# Patient Record
Sex: MALE | Race: WHITE | NOT HISPANIC OR LATINO
[De-identification: names, ages, dates, MRNs, and addresses within clinical notes are randomized per-mention and may not be internally consistent; named-entity substitution may affect disease eponyms.]

---

## 2019-05-24 ENCOUNTER — APPOINTMENT (OUTPATIENT)
Dept: UROLOGY | Facility: CLINIC | Age: 74
End: 2019-05-24
Payer: MEDICARE

## 2019-05-24 VITALS
SYSTOLIC BLOOD PRESSURE: 134 MMHG | HEART RATE: 80 BPM | DIASTOLIC BLOOD PRESSURE: 86 MMHG | RESPIRATION RATE: 17 BRPM | TEMPERATURE: 98.3 F

## 2019-05-24 DIAGNOSIS — N41.9 INFLAMMATORY DISEASE OF PROSTATE, UNSPECIFIED: ICD-10-CM

## 2019-05-24 PROCEDURE — 99204 OFFICE O/P NEW MOD 45 MIN: CPT | Mod: 25

## 2019-05-24 PROCEDURE — 51798 US URINE CAPACITY MEASURE: CPT

## 2019-05-24 NOTE — REVIEW OF SYSTEMS
[Negative] : Heme/Lymph [Seen by urologist before (Name)  ___] : Preciously seen by a urologist: [unfilled] [Wake up at night to urinate  How many times?  ___] : wakes up to urinate [unfilled] times during the night [Urine retention] : urine retention [Slow urine stream] : slow urine stream

## 2019-05-29 LAB
BACTERIA UR CULT: NORMAL
BILIRUB UR QL STRIP: NORMAL
GLUCOSE UR-MCNC: NORMAL
HCG UR QL: 0.2 EU/DL
HGB UR QL STRIP.AUTO: NORMAL
KETONES UR-MCNC: NORMAL
LEUKOCYTE ESTERASE UR QL STRIP: NORMAL
NITRITE UR QL STRIP: NORMAL
PH UR STRIP: 5
PROT UR STRIP-MCNC: NORMAL
SP GR UR STRIP: 1.01

## 2019-05-29 NOTE — PHYSICAL EXAM
[General Appearance - Well Developed] : well developed [General Appearance - Well Nourished] : well nourished [Edema] : no peripheral edema [Exaggerated Use Of Accessory Muscles For Inspiration] : no accessory muscle use [Abdomen Tenderness] : non-tender [Abdomen Soft] : soft [Abdomen Mass (___ Cm)] : no abdominal mass palpated [Size ___ (gms)] : size was estimated to be [unfilled] g [Abdomen Hernia] : no hernia was discovered [Prostate Hard Area Or Nodule Bilaterally] : had no palpable nodules [Rectal Exam - Prostate] : was not indurated [Nl Inspection] : the anus was normal on inspection. [No Lesions] : no lesions [Normal] : normal [Circumcised] : the penis was circumcised [Scrotum Hydrocele On The Right] : no hydrocele [Scrotum Hydrocele On The Left] : no hydrocele [Testes] : normal [Epididymis] : was normal [Vas Deferens / Spermatic Cord] : was normal [Normal Station and Gait] : the gait and station were normal for the patient's age [] : no rash [No Focal Deficits] : no focal deficits [Inguinal Lymph Nodes Enlarged Bilaterally] : inguinal [Oriented To Time, Place, And Person] : oriented to person, place, and time

## 2019-05-29 NOTE — HISTORY OF PRESENT ILLNESS
[FreeTextEntry1] : Patient presents for management of some urinary issues.\par he notes fairy acute onset of dysuria extending into perineum with increased frequency and a slower stream. felt a bit better when bladder full. He had no hematuria, discharge or other obstructive symptoms,. He has prior h/o LUTs and has been on Flomax for years, though doesn't like the ejaculatory side effects. His PCP treated him with Famvir which he felt helped; when symptoms returned he took Ciprofloxacin for 7 days with no help. notes prior episodes of prostatitis. \par \par  - prostate 40cc; PSA 0.5\par

## 2019-05-29 NOTE — ASSESSMENT
[FreeTextEntry1] : unclear if prostatitis alone and/or LUts - will treat empirically and change alpha blocker

## 2019-07-02 ENCOUNTER — APPOINTMENT (OUTPATIENT)
Dept: UROLOGY | Facility: CLINIC | Age: 74
End: 2019-07-02
Payer: MEDICARE

## 2019-07-02 PROCEDURE — 99213 OFFICE O/P EST LOW 20 MIN: CPT

## 2019-07-02 NOTE — ASSESSMENT
[FreeTextEntry1] : doing well - discussed next steps - increase to 10mg, stay at 7 or drop back to 5mg\par ejaculatory issue betetr.\par Will drop back to 5mg

## 2019-07-02 NOTE — HISTORY OF PRESENT ILLNESS
[FreeTextEntry1] : Patient presents for management of some urinary issues.\par he notes fairy acute onset of dysuria extending into perineum with increased frequency and a slower stream. felt a bit better when bladder full. He had no hematuria, discharge or other obstructive symptoms,. He has prior h/o LUTs and has been on Flomax for years, though doesn't like the ejaculatory side effects. His PCP treated him with Famvir which he felt helped; when symptoms returned he took Ciprofloxacin for 7 days with no help. notes prior episodes of prostatitis. \par \par treated him empirically with Bactrim plus terazosin. Up to 7mg now - voiding much better than before.\par no dizziness. Not sure if 7mg was hugely different from 5mg \par

## 2019-12-09 ENCOUNTER — RX RENEWAL (OUTPATIENT)
Age: 74
End: 2019-12-09

## 2020-01-24 ENCOUNTER — APPOINTMENT (OUTPATIENT)
Dept: UROLOGY | Facility: CLINIC | Age: 75
End: 2020-01-24
Payer: MEDICARE

## 2020-01-24 VITALS
OXYGEN SATURATION: 95 % | HEART RATE: 75 BPM | DIASTOLIC BLOOD PRESSURE: 78 MMHG | SYSTOLIC BLOOD PRESSURE: 123 MMHG | TEMPERATURE: 97.5 F | RESPIRATION RATE: 17 BRPM

## 2020-01-24 PROCEDURE — 99213 OFFICE O/P EST LOW 20 MIN: CPT

## 2020-01-24 RX ORDER — SULFAMETHOXAZOLE AND TRIMETHOPRIM 800; 160 MG/1; MG/1
800-160 TABLET ORAL TWICE DAILY
Qty: 14 | Refills: 0 | Status: ACTIVE | OUTPATIENT
Start: 2020-01-24

## 2020-01-24 NOTE — PHYSICAL EXAM
[General Appearance - Well Developed] : well developed [General Appearance - Well Nourished] : well nourished [Normal Appearance] : normal appearance [Well Groomed] : well groomed [General Appearance - In No Acute Distress] : no acute distress [Abdomen Soft] : soft [Abdomen Tenderness] : non-tender [Costovertebral Angle Tenderness] : no ~M costovertebral angle tenderness [Urethral Meatus] : meatus normal [Urinary Bladder Findings] : the bladder was normal on palpation [Scrotum] : the scrotum was normal [Testes Mass (___cm)] : there were no testicular masses [No Prostate Nodules] : no prostate nodules [Prostate Size ___ gm] : prostate size [unfilled] gm [Edema] : no peripheral edema [] : no respiratory distress [Respiration, Rhythm And Depth] : normal respiratory rhythm and effort [Exaggerated Use Of Accessory Muscles For Inspiration] : no accessory muscle use [Oriented To Time, Place, And Person] : oriented to person, place, and time [Affect] : the affect was normal [Mood] : the mood was normal [Normal Station and Gait] : the gait and station were normal for the patient's age [Not Anxious] : not anxious [No Focal Deficits] : no focal deficits [No Palpable Adenopathy] : no palpable adenopathy [FreeTextEntry1] : p

## 2020-01-24 NOTE — HISTORY OF PRESENT ILLNESS
[FreeTextEntry1] : MR. Castano is a 74 yom who presents for f/u of prostatitis and LUTs 2/2 to BPH.  He reports that he has recurrence of his prostatitis symptoms, with slight burning on urination.  He self-medicated with leftover bactrim that he had, which he said improved his symptoms signficantly.  He is currently taking 7mg of the terazosin, and his other urinary symptoms have been stable since last visit, including frequency and strength of stream.  Patient is currently experiencing no nausea and no anorexia no urinary retention, no gross hematuria, no bladder spasm, no abdominal pain, no flank pain, no fever and no fatigue\par

## 2020-01-24 NOTE — ASSESSMENT
[FreeTextEntry1] : Patient history is consistent with a recurrence of his prostatitis.  Bactrim has worked for him in the past and I will prescribe another dose to be taken over 7 days.  We will follow-up in 6 months.

## 2020-01-27 ENCOUNTER — RX RENEWAL (OUTPATIENT)
Age: 75
End: 2020-01-27

## 2020-01-27 RX ORDER — SULFAMETHOXAZOLE AND TRIMETHOPRIM 800; 160 MG/1; MG/1
800-160 TABLET ORAL TWICE DAILY
Qty: 20 | Refills: 1 | Status: ACTIVE | COMMUNITY
Start: 2019-05-24 | End: 1900-01-01

## 2020-07-13 ENCOUNTER — RX RENEWAL (OUTPATIENT)
Age: 75
End: 2020-07-13

## 2020-07-24 ENCOUNTER — APPOINTMENT (OUTPATIENT)
Dept: UROLOGY | Facility: CLINIC | Age: 75
End: 2020-07-24

## 2020-09-17 ENCOUNTER — APPOINTMENT (OUTPATIENT)
Dept: DISASTER EMERGENCY | Facility: CLINIC | Age: 75
End: 2020-09-17

## 2020-12-16 ENCOUNTER — RX RENEWAL (OUTPATIENT)
Age: 75
End: 2020-12-16

## 2021-01-20 ENCOUNTER — RX RENEWAL (OUTPATIENT)
Age: 76
End: 2021-01-20

## 2021-04-06 ENCOUNTER — OUTPATIENT (OUTPATIENT)
Dept: OUTPATIENT SERVICES | Facility: HOSPITAL | Age: 76
LOS: 1 days | End: 2021-04-06
Payer: COMMERCIAL

## 2021-04-06 VITALS
OXYGEN SATURATION: 97 % | SYSTOLIC BLOOD PRESSURE: 118 MMHG | HEART RATE: 83 BPM | RESPIRATION RATE: 16 BRPM | WEIGHT: 190.04 LBS | DIASTOLIC BLOOD PRESSURE: 74 MMHG | TEMPERATURE: 98 F | HEIGHT: 71 IN

## 2021-04-06 DIAGNOSIS — Z87.81 PERSONAL HISTORY OF (HEALED) TRAUMATIC FRACTURE: ICD-10-CM

## 2021-04-06 DIAGNOSIS — Z11.52 ENCOUNTER FOR SCREENING FOR COVID-19: ICD-10-CM

## 2021-04-06 DIAGNOSIS — Z95.1 PRESENCE OF AORTOCORONARY BYPASS GRAFT: Chronic | ICD-10-CM

## 2021-04-06 DIAGNOSIS — Z98.890 OTHER SPECIFIED POSTPROCEDURAL STATES: Chronic | ICD-10-CM

## 2021-04-06 DIAGNOSIS — I25.10 ATHEROSCLEROTIC HEART DISEASE OF NATIVE CORONARY ARTERY WITHOUT ANGINA PECTORIS: ICD-10-CM

## 2021-04-06 DIAGNOSIS — J34.89 OTHER SPECIFIED DISORDERS OF NOSE AND NASAL SINUSES: ICD-10-CM

## 2021-04-06 DIAGNOSIS — Z01.818 ENCOUNTER FOR OTHER PREPROCEDURAL EXAMINATION: ICD-10-CM

## 2021-04-06 LAB
ANION GAP SERPL CALC-SCNC: 14 MMOL/L — SIGNIFICANT CHANGE UP (ref 5–17)
BUN SERPL-MCNC: 19 MG/DL — SIGNIFICANT CHANGE UP (ref 7–23)
CALCIUM SERPL-MCNC: 9.1 MG/DL — SIGNIFICANT CHANGE UP (ref 8.4–10.5)
CHLORIDE SERPL-SCNC: 104 MMOL/L — SIGNIFICANT CHANGE UP (ref 96–108)
CO2 SERPL-SCNC: 20 MMOL/L — LOW (ref 22–31)
CREAT SERPL-MCNC: 0.97 MG/DL — SIGNIFICANT CHANGE UP (ref 0.5–1.3)
GLUCOSE SERPL-MCNC: 121 MG/DL — HIGH (ref 70–99)
HCT VFR BLD CALC: 44.4 % — SIGNIFICANT CHANGE UP (ref 39–50)
HGB BLD-MCNC: 14.4 G/DL — SIGNIFICANT CHANGE UP (ref 13–17)
MCHC RBC-ENTMCNC: 29.4 PG — SIGNIFICANT CHANGE UP (ref 27–34)
MCHC RBC-ENTMCNC: 32.4 GM/DL — SIGNIFICANT CHANGE UP (ref 32–36)
MCV RBC AUTO: 90.6 FL — SIGNIFICANT CHANGE UP (ref 80–100)
NRBC # BLD: 0 /100 WBCS — SIGNIFICANT CHANGE UP (ref 0–0)
PLATELET # BLD AUTO: 364 K/UL — SIGNIFICANT CHANGE UP (ref 150–400)
POTASSIUM SERPL-MCNC: 3.9 MMOL/L — SIGNIFICANT CHANGE UP (ref 3.5–5.3)
POTASSIUM SERPL-SCNC: 3.9 MMOL/L — SIGNIFICANT CHANGE UP (ref 3.5–5.3)
RBC # BLD: 4.9 M/UL — SIGNIFICANT CHANGE UP (ref 4.2–5.8)
RBC # FLD: 14.7 % — HIGH (ref 10.3–14.5)
SARS-COV-2 RNA SPEC QL NAA+PROBE: SIGNIFICANT CHANGE UP
SODIUM SERPL-SCNC: 138 MMOL/L — SIGNIFICANT CHANGE UP (ref 135–145)
WBC # BLD: 6.08 K/UL — SIGNIFICANT CHANGE UP (ref 3.8–10.5)
WBC # FLD AUTO: 6.08 K/UL — SIGNIFICANT CHANGE UP (ref 3.8–10.5)

## 2021-04-06 PROCEDURE — U0005: CPT

## 2021-04-06 PROCEDURE — 85027 COMPLETE CBC AUTOMATED: CPT

## 2021-04-06 PROCEDURE — C9803: CPT

## 2021-04-06 PROCEDURE — G0463: CPT

## 2021-04-06 PROCEDURE — U0003: CPT

## 2021-04-06 PROCEDURE — 80048 BASIC METABOLIC PNL TOTAL CA: CPT

## 2021-04-06 RX ORDER — SODIUM CHLORIDE 9 MG/ML
3 INJECTION INTRAMUSCULAR; INTRAVENOUS; SUBCUTANEOUS EVERY 8 HOURS
Refills: 0 | Status: DISCONTINUED | OUTPATIENT
Start: 2021-04-08 | End: 2021-04-22

## 2021-04-06 RX ORDER — LIDOCAINE HCL 20 MG/ML
0.2 VIAL (ML) INJECTION ONCE
Refills: 0 | Status: DISCONTINUED | OUTPATIENT
Start: 2021-04-08 | End: 2021-04-22

## 2021-04-06 NOTE — H&P PST ADULT - NSICDXPASTMEDICALHX_GEN_ALL_CORE_FT
PAST MEDICAL HISTORY:  CAD (coronary atherosclerotic disease) 08/2008, stent palced x1     PAST MEDICAL HISTORY:  Acid reflux s/p Endoscopy 2 weks ago, was normal    BPH (benign prostatic hyperplasia)     CAD (coronary atherosclerotic disease) 08/2008 & had  coronary stent palced x1    History of fracture of nasal bone     Hyperlipemia

## 2021-04-06 NOTE — H&P PST ADULT - HISTORY OF PRESENT ILLNESS
75 year old male with h/o fall & repair of nasal injury/ nasal bone fracture repair in 05/2020, reports having surgery to repair nose to correct the defect, presents for scheduled surgery on 04/08/2021. 75 year old male with h/o fall & repair of nasal bone fracture repair of nostrils in 05/2020, reports having surgery to correct the defect, presents for scheduled Alar Battan graft to left nostril with conchal cartilage harvest surgery on 04/08/2021.  *** JACK-Covid 2 swab testing done on 04/06/2021,.  Patient  denies any signs & symptoms of covid-19, no recent travel outside  [facility] with in 14 days , not visited any sick person.   Denies fever, cough, shortness of breadth, diarrhea, throat pain, changes in taste/smell or any rash.

## 2021-04-06 NOTE — H&P PST ADULT - NSICDXPROBLEM_GEN_ALL_CORE_FT
TAKE 150 MG (TWO 75MG TABLETS) OF PLAVIX TONIGHT AND AGAIN TOMORROW MORNING  Cindy Borja you will take one 75mg tablet daily, beginning on Friday 2/2/18 
PROBLEM DIAGNOSES  Problem: H/O closed fracture of nasal bones  Assessment and Plan: scheduled Alar Battan graft to left nostril with conchal cartilage harvest surgery on 04/08/2021.  *** JACK-Covid 2 swab testing done on 04/06/2021,.      Problem: CAD (coronary artery disease)  Assessment and Plan: Instructed to continue meds &  take with sips of water in AM the day of surgery

## 2021-04-06 NOTE — H&P PST ADULT - ATTENDING COMMENTS
Patient presents for reconstruction of his left alar rim for collapse. The plan is to perform a left alar battan graft with harvest of conchal cartilage from the left ear. There has been no change in the patient's medical condition since the last visit. He has no complaints of pain, fevers or chills. The risks, benefits and alternatives were discussed with the patient which included infection, bleeding, pain, scarring, asymmetry, nerve injury, asymmetry, breathing issues, cartialge warping, need for revision surgeries, cosmetic deformity, wound healing complications. All of the patient's questions were answered and consent was obtained.

## 2021-04-06 NOTE — H&P PST ADULT - NSICDXPASTSURGICALHX_GEN_ALL_CORE_FT
PAST SURGICAL HISTORY:  H/O elbow surgery right 2018, bursa sac repair    H/O rhinoplasty 09/22/2020    S/P CABG x 4 in 2000, [facility],

## 2021-04-06 NOTE — H&P PST ADULT - ASSESSMENT
scheduled Alar Battan graft to left nostril with conchal cartilage harvest surgery on 04/08/2021.  *** JACK-Covid 2 swab testing done on 04/06/2021,.

## 2021-04-06 NOTE — H&P PST ADULT - HEALTH CARE MAINTENANCE
Flu vaccine in 2020, had moderna second  On yearly Annual physical  Last colonoscopy - 201 Flu vaccine in 2020,  had second dose of moderna Covid vaccine on 3/14/21  On yearly Annual physicals  Last colonoscopy - 201

## 2021-04-07 ENCOUNTER — TRANSCRIPTION ENCOUNTER (OUTPATIENT)
Age: 76
End: 2021-04-07

## 2021-04-07 NOTE — ASU DISCHARGE PLAN (ADULT/PEDIATRIC) - CARE PROVIDER_API CALL
Pee Butler (MD)  Surgery  [address] [place] [id number]  Phone: [phone]  Fax: [phone]  Scheduled Appointment: 04/16/2021

## 2021-04-07 NOTE — ASU DISCHARGE PLAN (ADULT/PEDIATRIC) - NURSING INSTRUCTIONS
******************************************************************************************  Next dose of TYLENOL may be taken at or after _______5.18_____ PM if needed. DO NOT take any additional products containing TYLENOL or ACETAMINOPHEN, such as VICODIN, PERCOCET, NORCO, EXCEDRIN, and any over-the-counter cold medications until this time. DO NOT CONSUME MORE THAN 0860-0298 MG OF TYLENOL (acetaminophen) in a 24-hour period.

## 2021-04-07 NOTE — ASU DISCHARGE PLAN (ADULT/PEDIATRIC) - ASU DC SPECIAL INSTRUCTIONSFT
Keep dressing on the left ear clean and dry for 1 week, do not get wet  No nose blowing, sleep with HOB elevated please  Apply ointment intra-nasally (bacitracin)  No sports, exercise or heavy lifting for 1 week Keep dressing on the left ear clean and dry for 1 week, do not get wet  No nose blowing, sleep with HOB elevated please  Apply ointment intra-nasally (bacitracin)  No sports, exercise or heavy lifting for 1 week  Take tramadol and tylenol for pain as needed

## 2021-04-07 NOTE — BRIEF OPERATIVE NOTE - NSICDXBRIEFPROCEDURE_GEN_ALL_CORE_FT
PROCEDURES:  Reconstruction, nasal valve, with  graft or alar batten graft insertion 07-Apr-2021 14:38:46  Pee Butler

## 2021-04-08 ENCOUNTER — OUTPATIENT (OUTPATIENT)
Dept: OUTPATIENT SERVICES | Facility: HOSPITAL | Age: 76
LOS: 1 days | End: 2021-04-08
Payer: COMMERCIAL

## 2021-04-08 VITALS
TEMPERATURE: 98 F | SYSTOLIC BLOOD PRESSURE: 101 MMHG | OXYGEN SATURATION: 100 % | HEART RATE: 70 BPM | RESPIRATION RATE: 18 BRPM | DIASTOLIC BLOOD PRESSURE: 55 MMHG

## 2021-04-08 VITALS
SYSTOLIC BLOOD PRESSURE: 130 MMHG | WEIGHT: 190.04 LBS | TEMPERATURE: 97 F | HEART RATE: 72 BPM | OXYGEN SATURATION: 97 % | DIASTOLIC BLOOD PRESSURE: 77 MMHG | RESPIRATION RATE: 16 BRPM | HEIGHT: 71 IN

## 2021-04-08 DIAGNOSIS — Z95.1 PRESENCE OF AORTOCORONARY BYPASS GRAFT: Chronic | ICD-10-CM

## 2021-04-08 DIAGNOSIS — J34.89 OTHER SPECIFIED DISORDERS OF NOSE AND NASAL SINUSES: ICD-10-CM

## 2021-04-08 DIAGNOSIS — Z98.890 OTHER SPECIFIED POSTPROCEDURAL STATES: Chronic | ICD-10-CM

## 2021-04-08 PROCEDURE — 30465 REPAIR NASAL STENOSIS: CPT

## 2021-04-08 PROCEDURE — 21235 EAR CARTILAGE GRAFT: CPT | Mod: LT

## 2021-04-08 RX ORDER — EZETIMIBE AND SIMVASTATIN 10; 80 MG/1; MG/1
1 TABLET, FILM COATED ORAL
Qty: 0 | Refills: 0 | DISCHARGE

## 2021-04-08 RX ORDER — OMEPRAZOLE 10 MG/1
1 CAPSULE, DELAYED RELEASE ORAL
Qty: 0 | Refills: 0 | DISCHARGE

## 2021-04-08 RX ORDER — UBIDECARENONE 100 MG
1 CAPSULE ORAL
Qty: 0 | Refills: 0 | DISCHARGE

## 2021-04-08 RX ORDER — OXYCODONE HYDROCHLORIDE 5 MG/1
5 TABLET ORAL ONCE
Refills: 0 | Status: DISCONTINUED | OUTPATIENT
Start: 2021-04-08 | End: 2021-04-08

## 2021-04-08 RX ORDER — CHOLECALCIFEROL (VITAMIN D3) 125 MCG
1 CAPSULE ORAL
Qty: 0 | Refills: 0 | DISCHARGE

## 2021-04-08 RX ORDER — ONDANSETRON 8 MG/1
4 TABLET, FILM COATED ORAL ONCE
Refills: 0 | Status: DISCONTINUED | OUTPATIENT
Start: 2021-04-08 | End: 2021-04-22

## 2021-04-08 RX ORDER — ASPIRIN/CALCIUM CARB/MAGNESIUM 324 MG
1 TABLET ORAL
Qty: 0 | Refills: 0 | DISCHARGE

## 2021-04-08 RX ORDER — SODIUM CHLORIDE 9 MG/ML
1000 INJECTION, SOLUTION INTRAVENOUS
Refills: 0 | Status: DISCONTINUED | OUTPATIENT
Start: 2021-04-08 | End: 2021-04-22

## 2021-04-08 NOTE — ASU PREOP CHECKLIST - TEMPERATURE IN FAHRENHEIT (DEGREES F)
[FreeTextEntry3] : Skin examination performed of the face, neck, chest, hands, lower legs;\par The patient is well, alert and oriented, pleasant and cooperative.\par Eyelids, conjunctivae, oral mucosa, digits and nails all normal.  \par No cervical adenopathy.\par \par penile shaft;  multiple tiny 1mm umbilicated papules;  extensive on R side, some on left; \par  96.8

## 2021-04-08 NOTE — ASU PATIENT PROFILE, ADULT - PSH
H/O elbow surgery  right 2018, bursa sac repair  H/O rhinoplasty  09/22/2020  S/P CABG x 4  in 2000, [facility],

## 2021-04-08 NOTE — ASU PATIENT PROFILE, ADULT - PMH
Acid reflux  s/p Endoscopy 2 weks ago, was normal  BPH (benign prostatic hyperplasia)    CAD (coronary atherosclerotic disease)  08/2008 & had  coronary stent palced x1  History of fracture of nasal bone    Hyperlipemia

## 2021-04-08 NOTE — ASU PREOP CHECKLIST - HEIGHT IN FEET
Pt states he took a pill marked M30 from a friend around 11:30am and within 15 mins began to feel floaty, itchy, and weird. Pt states the pill was supposed to help with pain. Also, smoked weed today. ABCs intact, alert and oriented X3.    5

## 2021-08-11 ENCOUNTER — RX RENEWAL (OUTPATIENT)
Age: 76
End: 2021-08-11

## 2021-08-16 PROBLEM — Z87.81 PERSONAL HISTORY OF (HEALED) TRAUMATIC FRACTURE: Chronic | Status: ACTIVE | Noted: 2021-04-06

## 2021-08-16 PROBLEM — I25.10 ATHEROSCLEROTIC HEART DISEASE OF NATIVE CORONARY ARTERY WITHOUT ANGINA PECTORIS: Chronic | Status: ACTIVE | Noted: 2021-04-06

## 2021-08-16 PROBLEM — E78.5 HYPERLIPIDEMIA, UNSPECIFIED: Chronic | Status: ACTIVE | Noted: 2021-04-06

## 2021-08-16 PROBLEM — N40.0 BENIGN PROSTATIC HYPERPLASIA WITHOUT LOWER URINARY TRACT SYMPTOMS: Chronic | Status: ACTIVE | Noted: 2021-04-06

## 2021-08-16 PROBLEM — K21.9 GASTRO-ESOPHAGEAL REFLUX DISEASE WITHOUT ESOPHAGITIS: Chronic | Status: ACTIVE | Noted: 2021-04-06

## 2021-09-07 ENCOUNTER — RX RENEWAL (OUTPATIENT)
Age: 76
End: 2021-09-07

## 2021-09-08 ENCOUNTER — APPOINTMENT (OUTPATIENT)
Dept: UROLOGY | Facility: CLINIC | Age: 76
End: 2021-09-08
Payer: MEDICARE

## 2021-09-08 DIAGNOSIS — N13.8 BENIGN PROSTATIC HYPERPLASIA WITH LOWER URINARY TRACT SYMPMS: ICD-10-CM

## 2021-09-08 DIAGNOSIS — N40.1 BENIGN PROSTATIC HYPERPLASIA WITH LOWER URINARY TRACT SYMPMS: ICD-10-CM

## 2021-09-08 PROCEDURE — 99213 OFFICE O/P EST LOW 20 MIN: CPT

## 2021-09-08 NOTE — HISTORY OF PRESENT ILLNESS
[FreeTextEntry1] : Patient presents for management of some urinary issues.\par he notes fairy acute onset of dysuria extending into perineum with increased frequency and a slower stream. felt a bit better when bladder full. He had no hematuria, discharge or other obstructive symptoms,. He has prior h/o LUTs and has been on Flomax for years, though doesn't like the ejaculatory side effects. His PCP treated him with Famvir which he felt helped; when symptoms returned he took Ciprofloxacin for 7 days with no help. notes prior episodes of prostatitis. \par \par treated him empirically with Bactrim plus terazosin. Up to 7mg now - voiding much better than before.\par no dizziness. Not sure if 7mg was hugely different from 5mg \par \par 9/21 - on Terazosin 7mg - happy with results. Nocturia 1-2 times with better FOS ets. No dysuria hematuria or dizziness. No outbreaks of Prostatitis. \par

## 2021-09-20 ENCOUNTER — TRANSCRIPTION ENCOUNTER (OUTPATIENT)
Age: 76
End: 2021-09-20

## 2022-04-27 ENCOUNTER — APPOINTMENT (OUTPATIENT)
Dept: ORTHOPEDIC SURGERY | Facility: CLINIC | Age: 77
End: 2022-04-27
Payer: MEDICARE

## 2022-04-27 VITALS — BODY MASS INDEX: 26.6 KG/M2 | WEIGHT: 190 LBS | HEIGHT: 71 IN

## 2022-04-27 PROCEDURE — 99214 OFFICE O/P EST MOD 30 MIN: CPT | Mod: 25

## 2022-04-27 PROCEDURE — 20605 DRAIN/INJ JOINT/BURSA W/O US: CPT | Mod: LT

## 2022-04-27 PROCEDURE — 73610 X-RAY EXAM OF ANKLE: CPT | Mod: LT

## 2022-04-27 PROCEDURE — J3490M: CUSTOM

## 2022-04-27 NOTE — ASSESSMENT
[FreeTextEntry1] : Discussed treatment options with patient.\par WBAT in supportive footwear.\par Ice to affected area.\par Pt. responded well to steroid injection in the past and this was offered again. \par Pt. opts for injection.

## 2022-04-27 NOTE — PROCEDURE
[FreeTextEntry3] : Patient has tried OTC's including aspirin, Ibuprofen, Aleve, etc or prescription NSAIDS, and/or exercises at home and/or physical therapy without satisfactory response and the risks benefits, and alternatives have been discussed, and verbal consent was obtained. \par \par The risks, benefits and contents of the injection have been discussed.  Risks include but are not limited to allergic reaction, flare reaction, permanent white skin discoloration at the injection site and infection.  The patient understands the risks and agrees to having the injection.  All questions have been answered.\par \par An injection of the left ankle joint was performed. The indication for this procedure was pain and inflammation. The site was prepped with alcohol and sterile technique used. An injection of Lidocaine 1cc of 1% , Bupivacaine (Marcaine) 1cc of 0.5% , Methylprednisolone (Depomedrol) 1cc of 80 mg was used. Patient tolerated procedure well. Patient was advised to call if redness, pain or fever occur, apply ice for 15 minutes out of every hour for the next 12-24 hours as tolerated and patient was advised to rest the joint(s) for 3 days. \par \par \par

## 2022-04-27 NOTE — PHYSICAL EXAM
[NL (40)] : plantar flexion 40 degrees [NL 30)] : inversion 30 degrees [NL (20)] : eversion 20 degrees [5___] : [facility] 5[unfilled]/5 [2+] : posterior tibialis pulse: 2+ [Normal] : saphenous nerve sensation normal [Left] : left ankle [Weight -] : weightbearing [] : non-antalgic

## 2022-04-27 NOTE — HISTORY OF PRESENT ILLNESS
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 CSI last visit 4/14/21 with significant improvement until recently. He is very active and plays ball four times a week. WB in supportive footwear. Ice to affected area.  [] : Post Surgical Visit: no [FreeTextEntry1] : L ankle

## 2022-05-27 NOTE — BRIEF OPERATIVE NOTE - PRIMARY SURGEON
5/27/2022       RE: Emiliana Helms  [address]  [facility] [address]     Dear Colleague,    Thank you for referring your patient, Emiliana Helms, to the [facility] [place] at [facility]. Please see a copy of my visit note below.    [facility] Dermatology Note  Encounter Date: May 27, 2022   [employer]-and[employer] and Telephone ([phone]). Location of teledermatologist: [facility] [place].  Start time: 1:30 PM. End time: 1:40 PM.    Dermatology Problem List:  1. Acne vulgaris, inflammatory  - Current tx:  isotretinoin (40-80-40 due to dry eyes/dry skin)  - Previous tx: Accutane (took for 1 month x2, stopped due to insurance issues), doxycycline, Tami, Clindamycin 1% lotion, tretinoin 0.025% cream  - Contraception: abstinence   ____________________________________________    Assessment & Plan:    # Acne vulgaris, chronic, active, improving, doing well on isotretinoin.  Tried to go up to 40 mg BID dosing but too much dryness so went back to 40 mg daily and doing better. We will start at 40 mg daily dosing.  - Labs reviewed: CBC, CMP, lipids, HCG quantitative from 4/27/2022  - Labs ordered: HCG qualitative, CBC and smear (given lymphopenia below).  - Continue to 40 mg once daily for month #3. One month supply with no refills provided.   - Goal dose is 10,500 to 15,400 mg for 150-220  mg/kg dosing in this 70 kg patient.   - Cumulative dose: 2400 mg.    # Lymphopenia.  Intermittent and predating isotretinoin, she notes her father and sibling also have issues and has been told she has low blood counts before. ?benign ethnic neutropenia. Advised CBC and smear with potential for heme referral pending results.  - CBC and smear  - consider heme referral    Procedures Performed:   None    Follow-up: 1 month(s) in-person, or earlier for new or changing lesions    Staff and Scribe:     Scribe  Disclosure:  I, Michelle West, am serving as a scribe to document services personally performed by Debby Cutler MD based on data collection and the provider's statements to me.     Provider Disclosure:   The documentation recorded by the scribe accurately reflects the services I personally performed and the decisions made by me.    Debby Cutler MD    Department of Dermatology  [facility]: Phone: [phone], Fax: [phone]  5/31/2022     ____________________________________________    CC: Accutane (Emiliana is here for an accutane follow-up. States her skin is dryer compared to her last visit.)    HPI:  Ms. Emiliana Helms is a(n) 20 year old female who presents today as a return patient for accutane follow up. The patient was last seen in dermatology on 4/27/2022 by myself, at which time she was advised to increase to 40 mg isotretinoin daily for treatment of acne.     Today, the patient states that she tried to increase to 40 mg twice daily, but her skin got very dry so she went back to once daily and is doing well on this dose. She notes that when she takes 40 mg bid, her eyes get so dry that it is painful for her to wear contact lenses. Patient otherwise reports that her acne is continuing to improve on this medication. She denies headaches, vision changes, GI upset, diarrhea, mood changes, and suicidal ideation. Patient is otherwise feeling well, without additional skin concerns.    Labs Reviewed:  N/A    Physical Exam:  Vitals: There were no vitals taken for this visit.  SKIN: no photos sent.  - No other lesions of concern on areas examined.     Medications:  Current Outpatient Medications   Medication     buPROPion (WELLBUTRIN XL) 150 MG 24 hr tablet     ISOtretinoin (ACCUTANE) 40 MG capsule     busPIRone (BUSPAR) 5 MG tablet     cetirizine (ZYRTEC) 10 MG tablet     clindamycin (CLEOCIN T) 1 % external  lotion     tretinoin (RETIN-A) 0.025 % external cream     No current facility-administered medications for this visit.      Past Medical History:   Patient Active Problem List   Diagnosis     Other idiopathic scoliosis, thoracolumbar region     Intermittent asthma, well controlled     Vitamin D deficiency     Major depression, single episode     Absence attack (H)     Exercise intolerance     Social anxiety disorder     Seasonal allergies     Past Medical History:   Diagnosis Date     Asthma, intermittent      Constipation 06/26/2011     Contact with or exposure to tuberculosis 12/2003    on INH ppd neg     Mild persistent asthma 08/20/2012     Other idiopathic scoliosis, thoracolumbar region 2014     Undiagnosed cardiac murmurs     tiny PFO     Vitamin D deficiency 2017    vitamin D 15        CC No referring provider defined for this encounter. on close of this encounter.       Pee Butler

## 2022-09-24 ENCOUNTER — RX RENEWAL (OUTPATIENT)
Age: 77
End: 2022-09-24

## 2022-10-06 ENCOUNTER — RX RENEWAL (OUTPATIENT)
Age: 77
End: 2022-10-06

## 2023-01-27 ENCOUNTER — APPOINTMENT (OUTPATIENT)
Dept: ORTHOPEDIC SURGERY | Facility: CLINIC | Age: 78
End: 2023-01-27
Payer: MEDICARE

## 2023-01-27 DIAGNOSIS — M19.072 PRIMARY OSTEOARTHRITIS, LEFT ANKLE AND FOOT: ICD-10-CM

## 2023-01-27 PROCEDURE — 99213 OFFICE O/P EST LOW 20 MIN: CPT | Mod: 25

## 2023-01-27 PROCEDURE — J3490M: CUSTOM

## 2023-01-27 PROCEDURE — 20605 DRAIN/INJ JOINT/BURSA W/O US: CPT | Mod: LT

## 2023-01-27 NOTE — PHYSICAL EXAM
[5___] : [facility] 5[unfilled]/5 [2+] : posterior tibialis pulse: 2+ [Normal] : saphenous nerve sensation normal [Left] : left ankle [Weight -] : weightbearing [NL (40)] : plantar flexion 40 degrees [NL 30)] : inversion 30 degrees [] : no pain when stressing lateral tarsal metatarsal joint [de-identified] : eversion 15 degrees [[id number]] : dorsiflexion 15 degrees

## 2023-01-27 NOTE — HISTORY OF PRESENT ILLNESS
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 CSI last visit 4/27/22 with significant improvement.  He reports that the pain has returned.  No new trauma. [] : Post Surgical Visit: no [FreeTextEntry1] : L ankle

## 2023-01-27 NOTE — ASSESSMENT
[FreeTextEntry1] : We discussed another steroid injection and he would like to proceed.\par Icing is recommended.\par Supportive shoes recommended.

## 2023-04-26 ENCOUNTER — RX RENEWAL (OUTPATIENT)
Age: 78
End: 2023-04-26

## 2023-04-26 RX ORDER — TERAZOSIN 2 MG/1
2 CAPSULE ORAL
Qty: 90 | Refills: 1 | Status: ACTIVE | COMMUNITY
Start: 2019-05-24 | End: 1900-01-01

## 2023-08-30 ENCOUNTER — APPOINTMENT (OUTPATIENT)
Dept: ORTHOPEDIC SURGERY | Facility: CLINIC | Age: 78
End: 2023-08-30
Payer: MEDICARE

## 2023-08-30 VITALS — BODY MASS INDEX: 26.6 KG/M2 | WEIGHT: 190 LBS | HEIGHT: 71 IN

## 2023-08-30 PROCEDURE — 73130 X-RAY EXAM OF HAND: CPT | Mod: LT

## 2023-08-30 PROCEDURE — L3908: CPT | Mod: LT

## 2023-08-30 PROCEDURE — 99204 OFFICE O/P NEW MOD 45 MIN: CPT | Mod: 25

## 2023-08-30 PROCEDURE — 29280 STRAPPING OF HAND OR FINGER: CPT | Mod: LT

## 2023-08-31 NOTE — DISCUSSION/SUMMARY
[de-identified] : - reviewed the nature of this injury and prognosis with the patient in layman's terms - discussed indications for both operative and nonoperative treatment - reviewed risks, benefits and alternatives to these - reviewed perioperative expectations in depth including timing to return to sport - with all of this in mind, the patient would like to proceed with ORIF - left cock up wrist brace and buddy straps for now - NSAIDs as needed for pain - f/u 2 weeks postoperatively

## 2023-08-31 NOTE — IMAGING
[Left] : left hand [de-identified] : Left hand with diffuse swelling and ttp over the 3rd MC shaft mild dorsal and volar ecchymosis is noted. Cascade is normal. All digits are nvi with FAROM. Left wrist with full and pain free ROM. There is no ttp over this region.  and intrinsic strength is not assessed due to pain.  [FreeTextEntry1] : left 3rd MC oblique displaced proximal shaft fracture

## 2023-08-31 NOTE — HISTORY OF PRESENT ILLNESS
[de-identified] : Location: Left Hand Injury: Patient reports that he was playing softball when he collided with another player, striking his left hand.  He noted immediate pain and swelling in the hand and was later seen in urgent care where he was placed in a splint with instructions to follow-up as an outpatient.  Today the patient notes mild pain but denies numbness and tingling.  Of note, the patient is extremely active and continues to play softball several times a week. Date of Injury: 08/27/2023 Prior treatments: Splint Hand dominance: Right Occupation: Retired  PMH: CAD, bypass (20 years ago) - pt denies blood thinners, Gerd, htn, BPH, Hyperlipidemia

## 2023-09-12 ENCOUNTER — APPOINTMENT (OUTPATIENT)
Age: 78
End: 2023-09-12
Payer: MEDICARE

## 2023-09-12 PROCEDURE — 26615 TREAT METACARPAL FRACTURE: CPT | Mod: F2

## 2023-09-21 ENCOUNTER — APPOINTMENT (OUTPATIENT)
Dept: ORTHOPEDIC SURGERY | Facility: CLINIC | Age: 78
End: 2023-09-21
Payer: MEDICARE

## 2023-09-21 PROCEDURE — 73130 X-RAY EXAM OF HAND: CPT | Mod: LT

## 2023-09-21 PROCEDURE — 99024 POSTOP FOLLOW-UP VISIT: CPT

## 2023-09-27 ENCOUNTER — RX RENEWAL (OUTPATIENT)
Age: 78
End: 2023-09-27

## 2023-09-27 RX ORDER — TERAZOSIN 5 MG/1
5 CAPSULE ORAL
Qty: 90 | Refills: 3 | Status: ACTIVE | COMMUNITY
Start: 2019-05-24 | End: 1900-01-01

## 2023-10-05 ENCOUNTER — APPOINTMENT (OUTPATIENT)
Dept: ORTHOPEDIC SURGERY | Facility: CLINIC | Age: 78
End: 2023-10-05
Payer: MEDICARE

## 2023-10-05 VITALS — WEIGHT: 190 LBS | HEIGHT: 71 IN | BODY MASS INDEX: 26.6 KG/M2

## 2023-10-05 PROCEDURE — 73130 X-RAY EXAM OF HAND: CPT | Mod: LT

## 2023-10-05 PROCEDURE — 99024 POSTOP FOLLOW-UP VISIT: CPT

## 2023-11-02 ENCOUNTER — APPOINTMENT (OUTPATIENT)
Dept: ORTHOPEDIC SURGERY | Facility: CLINIC | Age: 78
End: 2023-11-02
Payer: MEDICARE

## 2023-11-02 VITALS — HEIGHT: 71 IN | BODY MASS INDEX: 26.6 KG/M2 | WEIGHT: 190 LBS

## 2023-11-02 DIAGNOSIS — S62.323A DISPLACED FRACTURE OF SHAFT OF THIRD METACARPAL BONE, LEFT HAND, INITIAL ENCOUNTER FOR CLOSED FRACTURE: ICD-10-CM

## 2023-11-02 PROCEDURE — 99024 POSTOP FOLLOW-UP VISIT: CPT

## 2023-11-02 PROCEDURE — 73130 X-RAY EXAM OF HAND: CPT | Mod: LT

## 2024-05-20 ENCOUNTER — APPOINTMENT (OUTPATIENT)
Dept: ORTHOPEDIC SURGERY | Facility: CLINIC | Age: 79
End: 2024-05-20
Payer: MEDICARE

## 2024-05-20 DIAGNOSIS — M16.11 UNILATERAL PRIMARY OSTEOARTHRITIS, RIGHT HIP: ICD-10-CM

## 2024-05-20 PROCEDURE — 99204 OFFICE O/P NEW MOD 45 MIN: CPT | Mod: 25

## 2024-05-20 PROCEDURE — J3490M: CUSTOM

## 2024-05-20 PROCEDURE — 20611 DRAIN/INJ JOINT/BURSA W/US: CPT | Mod: RT

## 2024-05-20 PROCEDURE — 72100 X-RAY EXAM L-S SPINE 2/3 VWS: CPT

## 2024-05-20 PROCEDURE — 73503 X-RAY EXAM HIP UNI 4/> VIEWS: CPT | Mod: RT

## 2024-05-23 NOTE — PROCEDURE
[de-identified] : Ultrasound guidance was required to improve accuracy and the diagnostic and/or therapeutic value of the hip injection as well as to avoid critical neurovascular structures located in close proximity to the hip joint [FreeTextEntry1] : R hip US CSI [FreeTextEntry2] : pain [FreeTextEntry3] :  The patient was positioned supine on the exam table with the right anterior hip exposed. All risks and benefits were discussed with the patient including infection, bleeding, allergic reactions, worsening of symptoms, and possible neurovascular damage.  They stated understanding and were agreeable to proceed.   The skin overlying the injection site was sterilely prepped and ultrasound was used to localize the hip joint.  A 22 gauge spinal needle was inserted into the hip joint which was confirmed with ultrasound.  40mg Kenalog and 4cc of 0.5% Marcaine was then injected into the hip joint with confirmed ballottement of the hip capsule.  The needle was withdrawn and a band aid was placed.  The patient tolerated the procedure well without any apparent complications.  They were instructed to perform activities that would normally aggravate their hip and to take note of how much relief they get from the injection.  Counseled on concerning signs and symptoms including redness/swelling and to call the office with any problems.

## 2024-05-23 NOTE — IMAGING
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] : Femoral CAM lesion with Alpha angle greater than 50 [de-identified] : General: NAD, A&Ox3 Gait: Mildly antalgic, no Trendelenburg Foot Progression: neutral Knee Progression: neutral   R Hip Exam: Pain with Log Roll - negative Flexion: 100 Pain with Hyperflexion - no FADIR - pos [place] - pos Extension: 0 Flexion Contracture - none Prone Apprehension Test - neg Ischial tenderness - none Trochanteric tenderness - none   Abduction - 5 /5, pain - yes Adduction - 5 /5 Supine resisted SLR - 5 /5, pain - yes Dorsiflexion 5/5 Plantarflexion 5/5 EHL 5/5 DP/PT 2+, foot is warm and well perfused      Leg Lengths: symmetric

## 2024-05-23 NOTE — HISTORY OF PRESENT ILLNESS
[Right Leg] : right leg [Gradual] : gradual [5] : 5 [4] : 4 [Stabbing] : stabbing [Intermittent] : intermittent [Rest] : rest [Exercising] : exercising [de-identified] : JENNIFER LOWRY is a 78 year male here with right hip pain. Pain has been present for 3-4 months and is located in the groin radiating down his anterior leg to his knee.   Injury - none.  Mechanical symptoms - rarely buckling.  Exacerbating factors/activities/positions -  Pain during or after activity - Pain exacerbated with exercise Back pain - none. Radicular pain - radiating pain down to his right knee.   Previous Treatment: NSAIDs: none, takes Tylenol PT: none.  Surgery: none for this complaint.    Injections: CSI to left ankle, 1 month ago   Occupation: Retired, previously traded on [employer] Sports/Recreational Activities: Softball. [] : Post Surgical Visit: no [FreeTextEntry1] : right hip right knee  [FreeTextEntry5] : Pt has had a gradual onset of right hip pain for the last few weeks, pt has anterior hip pain with an without activity, pt also had pain that shoots down to his knee with activity as well as anterior knee pain and swelling when he plays baseball, pt has a hx of some back pain that is treated with a chiro  [de-identified] : none

## 2024-05-23 NOTE — DISCUSSION/SUMMARY
[de-identified] : - We discussed their diagnosis and treatment options at length including the risks and benefits of both surgical treatment with a total hip replacement and non-surgical options. - We will continue conservative treatment with activity modification, PT, icing, weight loss, and anti-inflammatory medications. - The patient was provided with a PT prescription to work on ROM, hip ER/abductors strengthening, quad/hamstring stretches and strengthening, and other exercises - The patient was advised to let pain guide the gradual advancement of activities. - We also discussed the possible of a corticosteroid injection in order to help decrease inflammation and pain so that they can perform better therapy.  This was performed today. - We discussed that a total joint replacement cannot be done within 3 months of a steroid injection to minimize the risk of infection - F/u in 6 weeks for recheck     Prior to appointment and during encounter with patient extensive medical records were reviewed including but not limited to, hospital records, out patient records, imaging results, and lab data. During this appointment the patient was examined, diagnoses were discussed and explained in a face to face manner. In addition extensive time was spent reviewing aforementioned diagnostic studies. Counseling including abnormal image results, differential diagnoses, treatment options, risk and benefits, lifestyle changes, current condition, and current medications was performed. Patient's comments, questions, and concerns were address and patient verbalized understanding.

## 2024-08-30 ENCOUNTER — APPOINTMENT (OUTPATIENT)
Dept: ORTHOPEDIC SURGERY | Facility: CLINIC | Age: 79
End: 2024-08-30

## 2024-08-30 VITALS — WEIGHT: 190 LBS | BODY MASS INDEX: 26.6 KG/M2 | HEIGHT: 71 IN

## 2024-08-30 DIAGNOSIS — M16.11 UNILATERAL PRIMARY OSTEOARTHRITIS, RIGHT HIP: ICD-10-CM

## 2024-08-30 PROCEDURE — 99213 OFFICE O/P EST LOW 20 MIN: CPT

## 2024-08-30 NOTE — DISCUSSION/SUMMARY
Problem: Diabetes  Goal: Achieves glycemic balance  Description: Goal is to maintain blood sugar within range with no episodes of hypoglycemia  Outcome: Outcome Met, Continue evaluating goal progress toward completion  Goal: Verbalizes/demonstrates understanding of NEW diagnosis of diabetes and management  Description: Document on Patient Education Activity  Outcome: Outcome Met, Continue evaluating goal progress toward completion  Goal: Verbalizes understanding of diabetes management including how to use HbA1C to evaluate status of blood sugar over time (Diabetes is NOT a new diagnosis)  Description: Diabetes Education  Outcome: Outcome Met, Continue evaluating goal progress toward completion  Goal: Demonstrates ability to self-administer insulin  Description: Document on Patient Education Activity  Outcome: Outcome Not Met, Continue to Monitor     Problem: Angina/Chest Pain  Goal: # Achieves Chest Pain Control (Pain Score = 0-1, no episodes)  Description: Chest pain control = Pain Score = 0-1, no episodes of pain  Outcome: Outcome Met, Continue evaluating goal progress toward completion  Goal: Anxiety is controlled  Outcome: Outcome Met, Continue evaluating goal progress toward completion  Goal: # Verbalizes understanding of symptoms, diagnosis, and treatment  Description: Document on Patient Education Activity  Outcome: Outcome Met, Continue evaluating goal progress toward completion      [de-identified] : Discussed repeat steroid injection today, he has a baseball tournament in [place] and would like to wait to do the injection until 2 weeks before then.  He was also given an injection into his ankle before he goes.  We again discussed total hip replacement and the relevant recovery associated with this.  I will see him back in October for repeat hip injection.  All of his questions were answered he is in agreement with this plan.

## 2024-08-30 NOTE — HISTORY OF PRESENT ILLNESS
[Right Leg] : right leg [Gradual] : gradual [5] : 5 [4] : 4 [Stabbing] : stabbing [Intermittent] : intermittent [Rest] : rest [Exercising] : exercising [de-identified] : JENNIFER LOWRY is a 78 year male here with right hip pain. Pain has been present for 3-4 months and is located in the groin radiating down his anterior leg to his knee.   Injury - none.  Mechanical symptoms - rarely buckling.  Exacerbating factors/activities/positions -  Pain during or after activity - Pain exacerbated with exercise Back pain - none. Radicular pain - radiating pain down to his right knee.   Previous Treatment: NSAIDs: none, takes Tylenol PT: none.  Surgery: none for this complaint.    Injections: CSI to left ankle, 1 month ago   Occupation: Retired, previously traded on [employer] Sports/Recreational Activities: Softball.  8/30/24 pt is here for fu of his right hip and knee today. States his hip pain has increased in the last 2 weeks. States previous csi with good relief.  [] : Post Surgical Visit: no [FreeTextEntry1] : right hip right knee  [FreeTextEntry5] : Pt has had a gradual onset of right hip pain for the last few weeks, pt has anterior hip pain with an without activity, pt also had pain that shoots down to his knee with activity as well as anterior knee pain and swelling when he plays baseball, pt has a hx of some back pain that is treated with a chiro  [de-identified] : none

## 2024-08-30 NOTE — IMAGING
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] : Femoral CAM lesion with Alpha angle greater than 50 [de-identified] : General: NAD, A&Ox3 Gait: Mildly antalgic, no Trendelenburg Foot Progression: neutral Knee Progression: neutral   R Hip Exam: Pain with Log Roll - negative Flexion: 100 Pain with Hyperflexion - no FADIR - pos [place] - pos Extension: 0 Flexion Contracture - none Prone Apprehension Test - neg Ischial tenderness - none Trochanteric tenderness - none   Abduction - 5 /5, pain - yes Adduction - 5 /5 Supine resisted SLR - 5 /5, pain - yes Dorsiflexion 5/5 Plantarflexion 5/5 EHL 5/5 DP/PT 2+, foot is warm and well perfused      Leg Lengths: symmetric

## 2024-10-07 ENCOUNTER — APPOINTMENT (OUTPATIENT)
Dept: ORTHOPEDIC SURGERY | Facility: CLINIC | Age: 79
End: 2024-10-07
Payer: MEDICARE

## 2024-10-07 DIAGNOSIS — M16.11 UNILATERAL PRIMARY OSTEOARTHRITIS, RIGHT HIP: ICD-10-CM

## 2024-10-07 PROCEDURE — 99214 OFFICE O/P EST MOD 30 MIN: CPT | Mod: 25

## 2024-10-07 PROCEDURE — J3490M: CUSTOM

## 2024-10-07 PROCEDURE — 20611 DRAIN/INJ JOINT/BURSA W/US: CPT | Mod: RT

## 2024-10-16 ENCOUNTER — APPOINTMENT (OUTPATIENT)
Dept: ORTHOPEDIC SURGERY | Facility: CLINIC | Age: 79
End: 2024-10-16
Payer: MEDICARE

## 2024-10-16 DIAGNOSIS — M19.072 PRIMARY OSTEOARTHRITIS, LEFT ANKLE AND FOOT: ICD-10-CM

## 2024-10-16 PROCEDURE — 20606 DRAIN/INJ JOINT/BURSA W/US: CPT | Mod: LT

## 2024-10-16 PROCEDURE — J3490M: CUSTOM

## 2024-10-16 PROCEDURE — 99214 OFFICE O/P EST MOD 30 MIN: CPT | Mod: 25

## 2024-10-16 PROCEDURE — 73610 X-RAY EXAM OF ANKLE: CPT | Mod: LT

## 2024-11-12 NOTE — H&P PST ADULT - AIRWAY
Continued Stay SW/CM Assessment/Plan of Care Note     Active Substitute Decision Maker (SDM)    There are no active Substitute Decision Maker (SDM) on file.       Progress note:  Case Management following for discharge needs. Coverage check for home inotrope initiated. Coverage with [facility] is as follows: \"[facility] Medicare dual- (Dobumatine) is covered if the Medicare Criteria has been met and deductible and out of pocket costs met. The patient is covered at 100%  Patient has since transitioned to milrinone infusion.    [facility]FT would prefer to have [facility] provide the inotrope at discharge if unable to wean.     Patient may need long-term course of IV antibiotics.    The plan remains to consult for IRP once she is medically stable. CM will continue to follow.      Disposition Recommendations:  SW/CM recommendation for discharge: Pharmacy IV and Enteral    Discharge Plan/Needs:     Continued Care and Services - Admitted Since 9/18/2024    No active coordination exists for this encounter.       Continued Care and Services - Linked Episodes Includes continued care and service providers from the active episodes linked to this encounter, which are listed below      Care Transitions Episode start date: 9/19/2024   There are no active outsourced providers for this episode.                 Prior To Hospitalization:    Living Situation: Spouse and residing at House    .  Support Systems: Home Care Staff   Home Devices/Equipment: CPAP/BiPAP machine (T)            Mobility Assist Devices: None   Type of Service Prior to Hospitalization: Nurse (specify)               Patient/Family discharge goal (s):  Intensive therapy program     Prior Function  Bed Mobility: Independent (10/18/24 1045 : Odin Fulton, PT)  Transfers: Independent (10/18/24 1045 : Odin Fulton, PT)  Ambulation in the Home: Independent (10/18/24 1045 : Odin Fulton, PT)  Ambulation in the Community: Independent (10/18/24 1045 : Odin Fulton,  PT)    Current Function  Last Filed Values         Value Time User    Current Function  significantly below baseline level of function 11/11/2024  2:17 PM Zayra Acuna, PT    Therapy Impairments  activity tolerance; balance; executive functioning; ROM; strength; pain; safety awareness; vestibular 11/11/2024  2:17 PM Zayra Acuna, PT    ADLs Requiring Support  bed mobility; transfers; ambulation; stairs 11/11/2024  2:17 PM Zayra Acuna, PT          Therapy Recommendations for Discharge:   PT:      Last Filed Values         Value Time User    PT Discharge Needs  therapy 5 or more times per week 11/11/2024  2:17 PM Zayra Acuna, PT          OT:       Last Filed Values         Value Time User    OT Discharge Needs  therapy 1-3 times per week 11/9/2024  4:45 PM Rajwinder Marte, OT          Barriers to Discharge  Identified Barriers to Discharge/Transition Planning: Medical necessity for acute care                   normal

## 2025-04-23 ENCOUNTER — APPOINTMENT (OUTPATIENT)
Dept: ORTHOPEDIC SURGERY | Facility: CLINIC | Age: 80
End: 2025-04-23
Payer: MEDICARE

## 2025-04-23 PROCEDURE — 20606 DRAIN/INJ JOINT/BURSA W/US: CPT | Mod: LT

## 2025-04-23 PROCEDURE — J3490M: CUSTOM | Mod: JZ

## 2025-04-23 PROCEDURE — 99213 OFFICE O/P EST LOW 20 MIN: CPT | Mod: 25

## 2025-05-02 ENCOUNTER — APPOINTMENT (OUTPATIENT)
Dept: ORTHOPEDIC SURGERY | Facility: CLINIC | Age: 80
End: 2025-05-02
Payer: MEDICARE

## 2025-05-02 PROCEDURE — 99212 OFFICE O/P EST SF 10 MIN: CPT | Mod: 25

## 2025-05-02 PROCEDURE — 20605 DRAIN/INJ JOINT/BURSA W/O US: CPT

## 2025-05-09 ENCOUNTER — APPOINTMENT (OUTPATIENT)
Dept: ORTHOPEDIC SURGERY | Facility: CLINIC | Age: 80
End: 2025-05-09
Payer: MEDICARE

## 2025-05-09 PROCEDURE — 20605 DRAIN/INJ JOINT/BURSA W/O US: CPT

## 2025-05-14 ENCOUNTER — APPOINTMENT (OUTPATIENT)
Dept: ORTHOPEDIC SURGERY | Facility: CLINIC | Age: 80
End: 2025-05-14
Payer: MEDICARE

## 2025-05-14 DIAGNOSIS — M19.072 PRIMARY OSTEOARTHRITIS, LEFT ANKLE AND FOOT: ICD-10-CM

## 2025-05-14 PROCEDURE — 20605 DRAIN/INJ JOINT/BURSA W/O US: CPT

## 2025-08-27 ENCOUNTER — RX RENEWAL (OUTPATIENT)
Age: 80
End: 2025-08-27